# Patient Record
Sex: MALE | Race: OTHER | NOT HISPANIC OR LATINO | Employment: UNEMPLOYED | URBAN - METROPOLITAN AREA
[De-identification: names, ages, dates, MRNs, and addresses within clinical notes are randomized per-mention and may not be internally consistent; named-entity substitution may affect disease eponyms.]

---

## 2021-02-16 ENCOUNTER — HOSPITAL ENCOUNTER (EMERGENCY)
Facility: HOSPITAL | Age: 56
Discharge: HOME | End: 2021-02-16
Attending: EMERGENCY MEDICINE
Payer: COMMERCIAL

## 2021-02-16 ENCOUNTER — APPOINTMENT (EMERGENCY)
Dept: RADIOLOGY | Facility: HOSPITAL | Age: 56
End: 2021-02-16
Payer: COMMERCIAL

## 2021-02-16 VITALS
HEART RATE: 92 BPM | WEIGHT: 170.13 LBS | SYSTOLIC BLOOD PRESSURE: 141 MMHG | RESPIRATION RATE: 16 BRPM | OXYGEN SATURATION: 99 % | DIASTOLIC BLOOD PRESSURE: 89 MMHG | TEMPERATURE: 97.2 F

## 2021-02-16 DIAGNOSIS — Z20.822 PERSON UNDER INVESTIGATION FOR COVID-19: ICD-10-CM

## 2021-02-16 DIAGNOSIS — R05.9 COUGH: Primary | ICD-10-CM

## 2021-02-16 PROCEDURE — 99282 EMERGENCY DEPT VISIT SF MDM: CPT

## 2021-02-16 PROCEDURE — U0003 INFECTIOUS AGENT DETECTION BY NUCLEIC ACID (DNA OR RNA); SEVERE ACUTE RESPIRATORY SYNDROME CORONAVIRUS 2 (SARS-COV-2) (CORONAVIRUS DISEASE [COVID-19]), AMPLIFIED PROBE TECHNIQUE, MAKING USE OF HIGH THROUGHPUT TECHNOLOGIES AS DESCRIBED BY CMS-2020-01-R: HCPCS | Performed by: PHYSICIAN ASSISTANT

## 2021-02-16 PROCEDURE — U0005 INFEC AGEN DETEC AMPLI PROBE: HCPCS | Performed by: PHYSICIAN ASSISTANT

## 2021-02-16 PROCEDURE — 99284 EMERGENCY DEPT VISIT MOD MDM: CPT | Performed by: PHYSICIAN ASSISTANT

## 2021-02-16 PROCEDURE — 71045 X-RAY EXAM CHEST 1 VIEW: CPT

## 2021-02-16 RX ORDER — MELATONIN
2000 DAILY
Qty: 14 TABLET | Refills: 0 | Status: SHIPPED | OUTPATIENT
Start: 2021-02-16 | End: 2021-02-23

## 2021-02-16 RX ORDER — BENZONATATE 100 MG/1
100 CAPSULE ORAL EVERY 8 HOURS
Qty: 21 CAPSULE | Refills: 0 | Status: SHIPPED | OUTPATIENT
Start: 2021-02-16

## 2021-02-16 RX ORDER — MULTIVIT WITH MINERALS/LUTEIN
1000 TABLET ORAL 2 TIMES DAILY
Qty: 14 TABLET | Refills: 0 | Status: SHIPPED | OUTPATIENT
Start: 2021-02-16 | End: 2021-02-23

## 2021-02-16 RX ORDER — MULTIVITAMIN
1 CAPSULE ORAL DAILY
Qty: 7 CAPSULE | Refills: 0 | Status: SHIPPED | OUTPATIENT
Start: 2021-02-16 | End: 2021-02-23

## 2021-02-16 NOTE — ED PROVIDER NOTES
History  Chief Complaint   Patient presents with    Labs Only     exposure for covid-requesting testing; cough for past week, body aches and pains  56-year-old male with no past medical history presenting for evaluation of cough x 5 days and request for covid testing  Pt reports he has had covid positive contacts at work and now with non productive cough x 5 days  Intermittent sob with coughing fits  No cp, abdominal pain, n/v/d  No fevers, chills or sweats  No medicines taken prior to arrival           None       History reviewed  No pertinent past medical history  History reviewed  No pertinent surgical history  History reviewed  No pertinent family history  I have reviewed and agree with the history as documented  E-Cigarette/Vaping    E-Cigarette Use Never User      E-Cigarette/Vaping Substances     Social History     Tobacco Use    Smoking status: Never Smoker    Smokeless tobacco: Never Used   Substance Use Topics    Alcohol use: Never     Frequency: Never    Drug use: Not Currently       Review of Systems   All other systems reviewed and are negative  Physical Exam  Physical Exam  Vitals signs and nursing note reviewed  Constitutional:       General: He is not in acute distress  Appearance: Normal appearance  He is well-developed  He is not ill-appearing, toxic-appearing or diaphoretic  HENT:      Head: Normocephalic and atraumatic  Right Ear: Ear canal and external ear normal       Left Ear: Ear canal and external ear normal    Eyes:      Conjunctiva/sclera: Conjunctivae normal       Comments: EOM grossly intact, lipoma/cyst lower R lid   Neck:      Musculoskeletal: Normal range of motion and neck supple  Vascular: No JVD  Cardiovascular:      Rate and Rhythm: Normal rate  Pulmonary:      Effort: Pulmonary effort is normal  No respiratory distress  Breath sounds: No wheezing  Abdominal:      General: There is no distension        Palpations: Abdomen is soft       Tenderness: There is no abdominal tenderness  Musculoskeletal:      Comments: FROM, steady gait, cap refill brisk, strength and sensation grossly intact throughout   Skin:     General: Skin is warm and dry  Capillary Refill: Capillary refill takes less than 2 seconds  Neurological:      Mental Status: He is alert and oriented to person, place, and time  Psychiatric:         Behavior: Behavior normal          Vital Signs  ED Triage Vitals [02/16/21 1007]   Temperature Pulse Respirations Blood Pressure SpO2   (!) 97 2 °F (36 2 °C) 92 16 141/89 99 %      Temp Source Heart Rate Source Patient Position - Orthostatic VS BP Location FiO2 (%)   Tympanic Monitor Sitting Right arm --      Pain Score       --           Vitals:    02/16/21 1007   BP: 141/89   Pulse: 92   Patient Position - Orthostatic VS: Sitting         Visual Acuity      ED Medications  Medications - No data to display    Diagnostic Studies  Results Reviewed     Procedure Component Value Units Date/Time    [id number] Collected: 02/16/21 1026    Lab Status: In process Specimen: Nares from Nasopharyngeal Swab Updated: 02/16/21 1041                 XR chest 1 view portable    (Results Pending)              Procedures  Procedures         ED Course                                           MDM  Number of Diagnoses or Management Options  Diagnosis management comments: 34-year-old male presenting for evaluation of cough and positive exposure to COVID from work, denies any fevers, reports intermittent shortness of only with coughing fits, COVID swab obtained today, advised self quarantine until results return, will rx vitamins, f/u with pcp as an outpatient, will call with results    All imaging discussed with patient, strict return to ED precautions discussed  Pt verbalizes understanding and agrees with plan   Pt is stable for discharge    Portions of the record may have been created with voice recognition software  Occasional wrong word or "sound a like" substitutions may have occurred due to the inherent limitations of voice recognition software  Read the chart carefully and recognize, using context, where substitutions have occurred  Disposition  Final diagnoses:   Cough   Person under investigation for COVID-19     Time reflects when diagnosis was documented in both MDM as applicable and the Disposition within this note     Time User Action Codes Description Comment    2/16/2021 10:26 AM Fabián HASKINS Add [R05] Cough     2/16/2021 10:26 AM Tito Velasco Add [Z20 822] Person under investigation for COVID-19       ED Disposition     ED Disposition Condition Date/Time Comment    Discharge Stable Tue Feb 16, 2021 10:26 AM Sang Londono discharge to home/self care              Follow-up Information     Follow up With Specialties Details Why Contact Info Additional [address] Family Medicine Call in 1 day  [address], [address] [address]  [address], [address], [address] [place], [place], [address] [place] [address] Emergency Department Emergency Medicine Go to  If symptoms worsen [address] [address]  [facility] Emergency Department          Patient's Medications   Discharge Prescriptions    ASCORBIC ACID (VITAMIN C) 1000 MG TABLET    Take 1 tablet (1,000 mg total) by mouth 2 (two) times a day for 7 days       Start Date: 2/16/2021 End Date: 2/23/2021       Order Dose: 1,000 mg       Quantity: 14 tablet    Refills: 0    BENZONATATE (TESSALON PERLES) 100 MG CAPSULE    Take 1 capsule (100 mg total) by mouth every 8 (eight) hours       Start Date: 2/16/2021 End Date: --       Order Dose: 100 mg       Quantity: 21 capsule    Refills: 0    CHOLECALCIFEROL (VITAMIN D3) 1,000 UNITS TABLET Take 2 tablets (2,000 Units total) by mouth daily for 7 days       Start Date: 2/16/2021 End Date: 2/23/2021       Order Dose: 2,000 Units       Quantity: 14 tablet    Refills: 0    MULTIPLE VITAMIN (MULTIVITAMIN) CAPSULE    Take 1 capsule by mouth daily for 7 days       Start Date: 2/16/2021 End Date: 2/23/2021       Order Dose: 1 capsule       Quantity: 7 capsule    Refills: 0     No discharge procedures on file      PDMP Review     None          ED Provider  Electronically Signed by           Zia Bui PA-C  02/16/21 4577

## 2021-02-17 LAB — SARS-COV-2 RNA RESP QL NAA+PROBE: POSITIVE

## 2024-10-29 ENCOUNTER — HOSPITAL ENCOUNTER (EMERGENCY)
Facility: HOSPITAL | Age: 59
Discharge: HOME | End: 2024-10-29
Attending: EMERGENCY MEDICINE

## 2024-10-29 VITALS
OXYGEN SATURATION: 100 % | TEMPERATURE: 97.9 F | SYSTOLIC BLOOD PRESSURE: 145 MMHG | WEIGHT: 186.07 LBS | DIASTOLIC BLOOD PRESSURE: 75 MMHG | HEART RATE: 65 BPM | RESPIRATION RATE: 18 BRPM

## 2024-10-29 DIAGNOSIS — K04.7 DENTAL ABSCESS: Primary | ICD-10-CM

## 2024-10-29 PROCEDURE — 99284 EMERGENCY DEPT VISIT MOD MDM: CPT | Performed by: EMERGENCY MEDICINE

## 2024-10-29 PROCEDURE — 99282 EMERGENCY DEPT VISIT SF MDM: CPT

## 2024-10-29 RX ORDER — TETRACAINE HYDROCHLORIDE 5 MG/ML
2 SOLUTION OPHTHALMIC ONCE
Status: COMPLETED | OUTPATIENT
Start: 2024-10-29 | End: 2024-10-29

## 2024-10-29 RX ADMIN — FLUORESCEIN SODIUM 1 STRIP: 1 STRIP OPHTHALMIC at 09:33

## 2024-10-29 RX ADMIN — TETRACAINE HYDROCHLORIDE 2 DROP: 5 SOLUTION OPHTHALMIC at 09:33

## 2024-10-29 NOTE — ED ATTENDING ATTESTATION
10/29/2024  I, Grady Nicole MD, saw and evaluated the patient. I have discussed the patient with the resident/non-physician practitioner and agree with the resident's/non-physician practitioner's findings, Plan of Care, and MDM as documented in the resident's/non-physician practitioner's note, except where noted. All available labs and Radiology studies were reviewed.  I was present for key portions of any procedure(s) performed by the resident/non-physician practitioner and I was immediately available to provide assistance.       At this point I agree with the current assessment done in the Emergency Department.  I have conducted an independent evaluation of this patient a history and physical is as follows:  Chief Complaint   Patient presents with    Facial Swelling     Patient with swelling and redness noted under right eye and right side of face. States has been there for a couple of days. States pain and swelling in upper gums as well that started prior to the eye swelling.     58 yo M c/o pain and swelling under his right eye, affecting the lower lid and some eye redness, no fever, no chills, no eye pain.  He denies visual disturbance.  He has some upper dental pain.    VSS.  Right infraorbital swelling and subtle maxillary swelling.  Very poor dentition and he has some tenderness of gingiva and remaining teeth.  No focal abscess.    I think this is odontogenic, with some facial cellulitis.      Globe findings are normal.      ED Course         Critical Care Time  Procedures

## 2024-10-29 NOTE — ED PROVIDER NOTES
ED Disposition       None          Assessment & Plan       Medical Decision Making  Ishaan Somers is a(n) 59 y.o. male with no significant PMH, presented to the ED for right eye pain and swelling. Symptoms began with pain a few days ago. Yesterday, patient noticed swelling under eye while at work. He denies inciting trauma. Pain is described as non-radiating, sharp, and rated a 5/10. Denies change in vision, photophobia, discharge, fever, and chills. On physical exam, chemosis and erythema of right lower and upper eyelid, conjunctivitis of right corner sclera, EOMI - no pain with EOM. Ddx preseptal cellulitis vs orbital cellulitis.     Risk  Prescription drug management.        ED Course as of 10/29/24 0939   Tue Oct 29, 2024   0938 Fluorescein stain negative.        Medications   fluorescein sodium sterile ophthalmic strip 1 strip (has no administration in time range)   tetracaine 0.5 % ophthalmic solution 2 drop (has no administration in time range)       ED Risk Strat Scores                                               History of Present Illness       Chief Complaint   Patient presents with    Facial Swelling     Patient with swelling and redness noted under right eye and right side of face. States has been there for a couple of days. States pain and swelling in upper gums as well that started prior to the eye swelling.       History reviewed. No pertinent past medical history.   History reviewed. No pertinent surgical history.   History reviewed. No pertinent family history.   Social History     Tobacco Use    Smoking status: Never    Smokeless tobacco: Never   Vaping Use    Vaping status: Never Used   Substance Use Topics    Alcohol use: Never    Drug use: Not Currently      E-Cigarette/Vaping    E-Cigarette Use Never User       E-Cigarette/Vaping Substances      I have reviewed and agree with the history as documented.     Ishaan Somers is a(n) 59 y.o. male with no significant PMH, presented to the ED for  right eye pain and swelling. Symptoms began with pain a few days ago. He took Tylenol to no avail. While at work yesterday, patient noticed swelling under eye. Patient works as a . Pain is described as non-radiating, sharp, and rated a 5/10. He denies inciting trauma, change in vision, photophobia, discharge, fever, and chills. Per patient, has not had previous similar symptoms.           Review of Systems   Constitutional:  Negative for chills and fever.   HENT:  Positive for dental problem. Negative for ear discharge, ear pain, rhinorrhea, sore throat and tinnitus.    Eyes:  Positive for pain. Negative for photophobia, discharge, itching and visual disturbance.   Respiratory:  Negative for cough and shortness of breath.    Cardiovascular:  Negative for chest pain.   Gastrointestinal:  Negative for abdominal pain.   Neurological:  Negative for dizziness and headaches.           Objective       ED Triage Vitals [10/29/24 0843]   Temperature Pulse Blood Pressure Respirations SpO2 Patient Position - Orthostatic VS   97.9 °F (36.6 °C) 65 145/75 18 100 % --      Temp Source Heart Rate Source BP Location FiO2 (%) Pain Score    Oral -- -- -- --      Vitals      Date and Time Temp Pulse SpO2 Resp BP Pain Score FACES Pain Rating User   10/29/24 0843 97.9 °F (36.6 °C) 65 100 % 18 145/75 -- -- SL            Physical Exam  HENT:      Head: Right periorbital erythema present.      Comments: Poor dentition     Right Ear: Tympanic membrane, ear canal and external ear normal.      Left Ear: Tympanic membrane, ear canal and external ear normal.      Mouth/Throat:      Mouth: Mucous membranes are moist.      Pharynx: Oropharynx is clear.   Eyes:      General:         Right eye: No discharge.         Left eye: No discharge.      Extraocular Movements: Extraocular movements intact.      Conjunctiva/sclera:      Right eye: Right conjunctiva is injected. Chemosis present. No exudate or hemorrhage.     Left eye: Left conjunctiva  is not injected. No chemosis, exudate or hemorrhage.     Pupils: Pupils are equal, round, and reactive to light.   Lymphadenopathy:      Head:      Right side of head: No submental, submandibular, tonsillar, preauricular, posterior auricular or occipital adenopathy.      Left side of head: No submental, submandibular, tonsillar, preauricular, posterior auricular or occipital adenopathy.      Cervical: No cervical adenopathy.      Right cervical: No superficial, deep or posterior cervical adenopathy.     Left cervical: No superficial, deep or posterior cervical adenopathy.   Neurological:      General: No focal deficit present.      Mental Status: He is alert.         Results Reviewed       None            No orders to display       Procedures    ED Medication and Procedure Management   Prior to Admission Medications   Prescriptions Last Dose Informant Patient Reported? Taking?   Ascorbic Acid (vitamin C) 1000 MG tablet   No No   Sig: Take 1 tablet (1,000 mg total) by mouth 2 (two) times a day for 7 days   Multiple Vitamin (multivitamin) capsule   No No   Sig: Take 1 capsule by mouth daily for 7 days   benzonatate (TESSALON PERLES) 100 mg capsule   No No   Sig: Take 1 capsule (100 mg total) by mouth every 8 (eight) hours   cholecalciferol (VITAMIN D3) 1,000 units tablet   No No   Sig: Take 2 tablets (2,000 Units total) by mouth daily for 7 days      Facility-Administered Medications: None     Patient's Medications   Discharge Prescriptions    No medications on file     No discharge procedures on file.  ED SEPSIS DOCUMENTATION            Allison Arshad MD  10/29/24 0984       Allison Arshad MD  10/29/24 0925       Allison Arshad MD  10/29/24 0918

## 2024-10-29 NOTE — Clinical Note
Ishaan Somers was seen and treated in our emergency department on 10/29/2024.    No restrictions        None    Diagnosis: Cellulitis secondary to dental abscess    Ishaan  may return to work on return date.    He may return on this date: 10/30/2024         If you have any questions or concerns, please don't hesitate to call.      Grady Nicole MD    ______________________________           _______________          _______________  Hospital Representative                              Date                                Time

## 2024-10-29 NOTE — DISCHARGE INSTRUCTIONS
Please take antibiotics as prescribed  Please make a follow up appointment with a dentist within the next 2-3 days.